# Patient Record
Sex: FEMALE | Race: WHITE | Employment: FULL TIME | URBAN - METROPOLITAN AREA
[De-identification: names, ages, dates, MRNs, and addresses within clinical notes are randomized per-mention and may not be internally consistent; named-entity substitution may affect disease eponyms.]

---

## 2017-04-21 PROCEDURE — 87624 HPV HI-RISK TYP POOLED RSLT: CPT | Performed by: OBSTETRICS & GYNECOLOGY

## 2017-04-21 PROCEDURE — 88175 CYTOPATH C/V AUTO FLUID REDO: CPT | Performed by: OBSTETRICS & GYNECOLOGY

## 2017-04-27 PROBLEM — M54.2 CERVICAL PAIN: Status: ACTIVE | Noted: 2017-04-27

## 2017-04-27 PROBLEM — M76.61 ACHILLES TENDINITIS OF RIGHT LOWER EXTREMITY: Status: ACTIVE | Noted: 2017-04-27

## 2018-03-17 ENCOUNTER — HOSPITAL ENCOUNTER (OUTPATIENT)
Age: 63
Discharge: HOME | End: 2018-03-17
Payer: COMMERCIAL

## 2018-03-17 VITALS
RESPIRATION RATE: 18 BRPM | WEIGHT: 130 LBS | DIASTOLIC BLOOD PRESSURE: 82 MMHG | HEIGHT: 62 IN | OXYGEN SATURATION: 100 % | SYSTOLIC BLOOD PRESSURE: 150 MMHG | TEMPERATURE: 98 F | BODY MASS INDEX: 23.92 KG/M2 | HEART RATE: 108 BPM

## 2018-03-17 DIAGNOSIS — B96.89 ACUTE BACTERIAL PHARYNGITIS: Primary | ICD-10-CM

## 2018-03-17 DIAGNOSIS — J02.8 ACUTE BACTERIAL PHARYNGITIS: Primary | ICD-10-CM

## 2018-03-17 LAB — S PYO AG THROAT QL: NEGATIVE

## 2018-03-17 PROCEDURE — 99213 OFFICE O/P EST LOW 20 MIN: CPT

## 2018-03-17 PROCEDURE — 99214 OFFICE O/P EST MOD 30 MIN: CPT

## 2018-03-17 PROCEDURE — 87430 STREP A AG IA: CPT

## 2018-03-17 RX ORDER — AMOXICILLIN 875 MG/1
875 TABLET, COATED ORAL 2 TIMES DAILY
Qty: 20 TABLET | Refills: 0 | Status: SHIPPED | OUTPATIENT
Start: 2018-03-17 | End: 2018-03-27

## 2018-03-17 NOTE — ED PROVIDER NOTES
Patient presents with:  Sore Throat      HPI:     Sandy Linares is a 58year old female who presents for evaluation of a chief complaint of sore throat, chills, and fatigue for the past 2 days. Positive exposure to strep throat at work.   No difficulty swal edema  HEAD: normocephalic, atraumatic  EYES: sclera non icteric bilateral, conjunctiva clear  EARS: TM  bilateral: normal  NOSE: nasal turbinates: pink, normal mucosa  THROAT: redness noted, uvula midline and airway patent  LUNGS: clear to auscultation bi

## 2018-04-24 PROBLEM — R29.898 TMJ CLICK: Status: ACTIVE | Noted: 2018-04-24

## 2018-04-24 PROBLEM — S03.00XA DISLOCATION OF TEMPOROMANDIBULAR JOINT, INITIAL ENCOUNTER: Status: ACTIVE | Noted: 2018-04-24

## 2018-06-06 PROBLEM — N95.2 VAGINAL ATROPHY: Status: ACTIVE | Noted: 2018-06-06

## 2018-11-14 PROBLEM — Z00.00 ANNUAL PHYSICAL EXAM: Status: ACTIVE | Noted: 2018-11-14

## 2018-11-17 PROCEDURE — 81003 URINALYSIS AUTO W/O SCOPE: CPT | Performed by: INTERNAL MEDICINE

## 2019-09-24 PROCEDURE — 88175 CYTOPATH C/V AUTO FLUID REDO: CPT | Performed by: OBSTETRICS & GYNECOLOGY

## 2019-09-24 PROCEDURE — 87624 HPV HI-RISK TYP POOLED RSLT: CPT | Performed by: OBSTETRICS & GYNECOLOGY

## 2019-12-26 ENCOUNTER — HOSPITAL ENCOUNTER (OUTPATIENT)
Age: 64
Discharge: HOME | End: 2019-12-26
Payer: COMMERCIAL

## 2019-12-26 VITALS
OXYGEN SATURATION: 99 % | TEMPERATURE: 98 F | SYSTOLIC BLOOD PRESSURE: 129 MMHG | HEART RATE: 73 BPM | RESPIRATION RATE: 18 BRPM | DIASTOLIC BLOOD PRESSURE: 65 MMHG

## 2019-12-26 DIAGNOSIS — N30.90 CYSTITIS: Primary | ICD-10-CM

## 2019-12-26 PROCEDURE — 99214 OFFICE O/P EST MOD 30 MIN: CPT

## 2019-12-26 PROCEDURE — 87086 URINE CULTURE/COLONY COUNT: CPT | Performed by: NURSE PRACTITIONER

## 2019-12-26 PROCEDURE — 87088 URINE BACTERIA CULTURE: CPT | Performed by: NURSE PRACTITIONER

## 2019-12-26 PROCEDURE — 87186 SC STD MICRODIL/AGAR DIL: CPT | Performed by: NURSE PRACTITIONER

## 2019-12-26 PROCEDURE — 81002 URINALYSIS NONAUTO W/O SCOPE: CPT

## 2019-12-26 RX ORDER — NITROFURANTOIN 25; 75 MG/1; MG/1
100 CAPSULE ORAL 2 TIMES DAILY
Qty: 10 CAPSULE | Refills: 0 | Status: SHIPPED | OUTPATIENT
Start: 2019-12-26 | End: 2019-12-31

## 2019-12-26 NOTE — ED PROVIDER NOTES
Patient presents with:  Urinary Symptoms      HPI:     Solange Valdivia is a 59year old female with a past history of ITP, hypothyroidism, hyperlipidemia resents with a chief complaint of urinary urgency, frequency, dysuria over the course the last 4 days.   Henna Calhoun understanding. Orders Placed This Encounter      POCT Urinalysis Dipstick Once      Urine Culture, Routine Once      Nitrofurantoin Monohyd Macro 100 MG Oral Cap          Sig: Take 1 capsule (100 mg total) by mouth 2 (two) times daily for 5 days.

## 2019-12-26 NOTE — ED INITIAL ASSESSMENT (HPI)
Reports urinary urgency, frequency and voiding small amounts for the last 4 days. Denies back pain. Denies flank pain.

## 2020-02-25 ENCOUNTER — HOSPITAL ENCOUNTER (OUTPATIENT)
Age: 65
Discharge: HOME | End: 2020-02-25
Payer: COMMERCIAL

## 2020-02-25 VITALS
TEMPERATURE: 98 F | HEIGHT: 62 IN | SYSTOLIC BLOOD PRESSURE: 133 MMHG | RESPIRATION RATE: 17 BRPM | DIASTOLIC BLOOD PRESSURE: 75 MMHG | WEIGHT: 135 LBS | HEART RATE: 96 BPM | OXYGEN SATURATION: 100 % | BODY MASS INDEX: 24.84 KG/M2

## 2020-02-25 DIAGNOSIS — R11.2 NON-INTRACTABLE VOMITING WITH NAUSEA, UNSPECIFIED VOMITING TYPE: Primary | ICD-10-CM

## 2020-02-25 PROCEDURE — 99214 OFFICE O/P EST MOD 30 MIN: CPT

## 2020-02-25 PROCEDURE — 96374 THER/PROPH/DIAG INJ IV PUSH: CPT

## 2020-02-25 PROCEDURE — 96361 HYDRATE IV INFUSION ADD-ON: CPT

## 2020-02-25 RX ORDER — ONDANSETRON 2 MG/ML
4 INJECTION INTRAMUSCULAR; INTRAVENOUS ONCE
Status: COMPLETED | OUTPATIENT
Start: 2020-02-25 | End: 2020-02-25

## 2020-02-25 RX ORDER — 0.9 % SODIUM CHLORIDE 0.9 %
1000 INTRAVENOUS SOLUTION INTRAVENOUS ONCE
Status: COMPLETED | OUTPATIENT
Start: 2020-02-25 | End: 2020-02-25

## 2020-02-25 RX ORDER — ACETAMINOPHEN 500 MG
1000 TABLET ORAL ONCE
Status: COMPLETED | OUTPATIENT
Start: 2020-02-25 | End: 2020-02-25

## 2020-02-25 NOTE — ED INITIAL ASSESSMENT (HPI)
PATIENT REPORTS NAUSEA/VOMITING STARTING TODAY 11. STATES SHE HAD A COLONOSCOPY THIS AM.  REPORTS MULTIPLE EPISODES OF EMESIS. DENIES ABDOMINAL PAIN.   STATES SHE NOTIFIED HER GASTROENTEROLOGIST AND [facility] WAS CALLED IN FOR FOR BUT IT HAS NOT BEEN READY F

## 2020-02-25 NOTE — ED PROVIDER NOTES
Patient presents with:  Vomiting      HPI:     Rocky aSms is a 59year old female with a history of hyperlipidemia, ITP, hypothyroidism presents with a chief complaint of headache and vomiting.   Patient states that today she had a colonoscopy this una Patient eating crackers now. States improvement in headache. No longer nauseated. Tolerating p.o. without any difficulty. I discussed with the patient bland diet. Close follow-up with GI was recommended.   She states that she feels well enough to go h

## 2022-05-19 NOTE — ED INITIAL ASSESSMENT (HPI)
Blood drawn from the patients left antecubital vein, first attempt, without difficulty.   PATIENT ARRIVED AMBULATORY TO ROOM C/O A SORE THROAT X4 DAYS. NO FEVERS. NO COUGH. SLIGHT NASAL CONGESTION. NO N/V/D. EASY NON LABORED RESPIRATIONS.  NO DISTRESS

## 2023-06-01 ENCOUNTER — HOSPITAL ENCOUNTER (OUTPATIENT)
Age: 68
Discharge: HOME | End: 2023-06-01
Attending: EMERGENCY MEDICINE
Payer: MEDICARE

## 2023-06-01 ENCOUNTER — APPOINTMENT (OUTPATIENT)
Dept: CT IMAGING | Age: 68
End: 2023-06-01
Attending: EMERGENCY MEDICINE
Payer: MEDICARE

## 2023-06-01 ENCOUNTER — APPOINTMENT (OUTPATIENT)
Dept: GENERAL RADIOLOGY | Age: 68
End: 2023-06-01
Attending: EMERGENCY MEDICINE
Payer: MEDICARE

## 2023-06-01 VITALS
SYSTOLIC BLOOD PRESSURE: 120 MMHG | DIASTOLIC BLOOD PRESSURE: 51 MMHG | OXYGEN SATURATION: 99 % | TEMPERATURE: 98 F | HEART RATE: 73 BPM | RESPIRATION RATE: 18 BRPM

## 2023-06-01 DIAGNOSIS — R07.89 ACUTE CHEST WALL PAIN: Primary | ICD-10-CM

## 2023-06-01 LAB
#MXD IC: 1 X10ˆ3/UL (ref 0.1–1)
ATRIAL RATE: 64 BPM
BUN BLD-MCNC: 18 MG/DL (ref 7–18)
CHLORIDE BLD-SCNC: 103 MMOL/L (ref 98–112)
CO2 BLD-SCNC: 26 MMOL/L (ref 21–32)
CREAT BLD-MCNC: 1.1 MG/DL
DDIMER WHOLE BLOOD: 604 NG/ML DDU (ref ?–400)
GFR SERPLBLD BASED ON 1.73 SQ M-ARVRAT: 55 ML/MIN/1.73M2 (ref 60–?)
GLUCOSE BLD-MCNC: 106 MG/DL (ref 70–99)
HCT VFR BLD AUTO: 41.2 %
HCT VFR BLD CALC: 46 %
HGB BLD-MCNC: 13.7 G/DL
ISTAT IONIZED CALCIUM FOR CHEM 8: 1.21 MMOL/L (ref 1.12–1.32)
LYMPHOCYTES # BLD AUTO: 1.7 X10ˆ3/UL (ref 1–4)
LYMPHOCYTES NFR BLD AUTO: 21.6 %
MCH RBC QN AUTO: 29 PG (ref 26–34)
MCHC RBC AUTO-ENTMCNC: 33.3 G/DL (ref 31–37)
MCV RBC AUTO: 87.1 FL (ref 80–100)
MIXED CELL %: 13.1 %
NEUTROPHILS # BLD AUTO: 5.2 X10ˆ3/UL (ref 1.5–7.7)
NEUTROPHILS NFR BLD AUTO: 65.3 %
P AXIS: 55 DEGREES
P-R INTERVAL: 160 MS
PLATELET # BLD AUTO: 308 X10ˆ3/UL (ref 150–450)
POTASSIUM BLD-SCNC: 5.3 MMOL/L (ref 3.6–5.1)
Q-T INTERVAL: 400 MS
QRS DURATION: 78 MS
QTC CALCULATION (BEZET): 412 MS
R AXIS: 5 DEGREES
RBC # BLD AUTO: 4.73 X10ˆ6/UL
SODIUM BLD-SCNC: 139 MMOL/L (ref 136–145)
T AXIS: 71 DEGREES
TROPONIN I BLD-MCNC: <0.02 NG/ML
VENTRICULAR RATE: 64 BPM
WBC # BLD AUTO: 7.9 X10ˆ3/UL (ref 4–11)

## 2023-06-01 PROCEDURE — 93010 ELECTROCARDIOGRAM REPORT: CPT

## 2023-06-01 PROCEDURE — 99215 OFFICE O/P EST HI 40 MIN: CPT

## 2023-06-01 PROCEDURE — 71101 X-RAY EXAM UNILAT RIBS/CHEST: CPT | Performed by: EMERGENCY MEDICINE

## 2023-06-01 PROCEDURE — 99214 OFFICE O/P EST MOD 30 MIN: CPT

## 2023-06-01 PROCEDURE — 85378 FIBRIN DEGRADE SEMIQUANT: CPT | Performed by: EMERGENCY MEDICINE

## 2023-06-01 PROCEDURE — 71260 CT THORAX DX C+: CPT | Performed by: EMERGENCY MEDICINE

## 2023-06-01 PROCEDURE — 93005 ELECTROCARDIOGRAM TRACING: CPT

## 2023-06-01 PROCEDURE — 85025 COMPLETE CBC W/AUTO DIFF WBC: CPT | Performed by: EMERGENCY MEDICINE

## 2023-06-01 PROCEDURE — 80047 BASIC METABLC PNL IONIZED CA: CPT

## 2023-06-01 PROCEDURE — 36415 COLL VENOUS BLD VENIPUNCTURE: CPT

## 2023-06-01 PROCEDURE — 84484 ASSAY OF TROPONIN QUANT: CPT

## 2023-06-01 RX ORDER — ACETAMINOPHEN 500 MG
1000 TABLET ORAL ONCE
Status: COMPLETED | OUTPATIENT
Start: 2023-06-01 | End: 2023-06-01

## 2023-06-01 NOTE — DISCHARGE INSTRUCTIONS
Thank you for visiting our immediate care for your health care needs. Please follow up with your regular doctor in the next 1-2 days. If you have any additional problems please return to the immediate care. Please take Tylenol for pain. Please use Salonpas over-the-counter for pain. Use ice. Please take Bakari Carlos as prescribed.

## 2023-06-01 NOTE — ED INITIAL ASSESSMENT (HPI)
Presents with pain to left anterior ribs. States she felt \"pop\" when she stood up from a chair 2 days ago. Painful to take a deep breath. Tender to touch. Difficulty sleeping due to pain.

## 2024-03-14 ENCOUNTER — HOSPITAL ENCOUNTER (OUTPATIENT)
Age: 69
Discharge: HOME | End: 2024-03-14
Payer: MEDICARE

## 2024-03-14 VITALS
RESPIRATION RATE: 20 BRPM | TEMPERATURE: 97 F | HEART RATE: 93 BPM | OXYGEN SATURATION: 100 % | DIASTOLIC BLOOD PRESSURE: 76 MMHG | SYSTOLIC BLOOD PRESSURE: 144 MMHG

## 2024-03-14 DIAGNOSIS — J02.9 VIRAL PHARYNGITIS: Primary | ICD-10-CM

## 2024-03-14 LAB — S PYO AG THROAT QL IA.RAPID: NEGATIVE

## 2024-03-14 PROCEDURE — 99213 OFFICE O/P EST LOW 20 MIN: CPT

## 2024-03-14 PROCEDURE — 99212 OFFICE O/P EST SF 10 MIN: CPT

## 2024-03-14 PROCEDURE — 87651 STREP A DNA AMP PROBE: CPT | Performed by: PHYSICIAN ASSISTANT

## 2024-03-15 NOTE — ED PROVIDER NOTES
Chief Complaint   Patient presents with    Sore Throat     History obtained from: patient   services not used     HPI:     Rosalina Nobles is a 68 year old female who presents with sore throat x 6 days.  Patient endorses pain with swallowing and swollen glands.  Patient states she recently watched her grandchild who had a fever.  Patient has been taking DayQuil and drinking tea and soup.  Denies fever, chills, facial or neck swelling, drooling, voice change, cough, shortness of breath, chest pain, ear pain, sinus pain or nasal congestion, rash.    PMH  Past Medical History:   Diagnosis Date    Depression     Esophageal reflux     GENITO-URINARY DISEASE     uti    Hypothyroidism     INFECTIOUS DISEASE     shingles    ITP (idiopathic thrombocytopenic purpura)     Menopause     Osteopenia     Other and unspecified hyperlipidemia     Rash of entire body 16     er from urgent care for low platelet count and body rash    Varicella        PFSH    PFSH asessment screens reviewed and agree.  Nurses notes reviewed I agree with documentation.    Family History   Problem Relation Age of Onset    Mental Disorder Mother         alzhiemer's    Dementia Mother     Other (Other) Mother     Thyroid Disorder Sister     Stroke Brother     Breast Cancer Cousin 40        pat cousin  age 40 at dx    Breast Cancer Paternal Cousin Female 40        age at dx 40     Family history reviewed with patient/caregiver and is not pertinent to presenting problem.  Social History     Socioeconomic History    Marital status:      Spouse name: Not on file    Number of children: Not on file    Years of education: Not on file    Highest education level: Not on file   Occupational History    Not on file   Tobacco Use    Smoking status: Former     Packs/day: .5     Types: Cigarettes     Quit date: 2009     Years since quittin.6    Smokeless tobacco: Never   Vaping Use    Vaping Use: Never used   Substance and Sexual Activity     Alcohol use: Yes     Alcohol/week: 0.0 standard drinks of alcohol     Comment: occ wine    Drug use: No    Sexual activity: Yes     Partners: Male     Comment: 11/10/2020    Other Topics Concern    Not on file   Social History Narrative    Not on file     Social Determinants of Health     Financial Resource Strain: Not on file   Food Insecurity: Not on file   Transportation Needs: Not on file   Physical Activity: Not on file   Stress: Not on file   Social Connections: Not on file   Housing Stability: Not on file         ROS:   Positive for stated complaint: Sore throat  All other systems reviewed and negative except as noted above.  Constitutional and Vital Signs Reviewed.    Physical Exam:     Findings:    /76   Pulse 93   Temp 97.4 °F (36.3 °C) (Temporal)   Resp 20   SpO2 100%   GENERAL: well developed, no acute distress, non-toxic appearing   SKIN: good skin turgor, no obvious rashes  HEAD: normocephalic, atraumatic  EYES: sclera non-icteric bilaterally, conjunctiva clear bilaterally  OROPHARYNX: MMM, pharynx mildly erythematous, no exudates or swelling, uvula midline, no tongue elevation, maintaining airway and secretions  NECK: supple, no lymphadenopathy, no nuchal rigidity, no trismus, no edema, phonation normal    CARDIO: RRR, normal heart sounds   LUNGS: clear to auscultation bilaterally, no increased WOB, no rales, rhonchi, or wheezes  GI: abdomen soft and non-tender   EXTREMITIES: no cyanosis or edema, LION without difficulty  NEURO: no focal deficits  PSYCH: alert and oriented x3, answering questions appropriately, mood appropriate    MDM/Assessment/Plan:   Orders for this encounter:    Orders Placed This Encounter    Rapid Strep A - ID NOW     Order Specific Question:   Release to patient     Answer:   Immediate       Labs performed this visit:  Recent Results (from the past 10 hour(s))   Rapid Strep A - ID NOW    Collection Time: 03/14/24  6:33 PM    Specimen: Throat; Other   Result Value  Ref Range    Strep A by PCR Negative Negative       Imaging performed this visit:  No orders to display       Medical Decision Making  DDx includes viral pharyngitis versus strep pharyngitis versus laryngitis versus other.  Patient is overall very well-appearing with stable vitals and tolerating oral intake.  No signs of PTA or airway compromise.  Strep negative.  Discussed supportive care including rest, increased fluid intake, OTC Tylenol/Motrin as needed for pain or fevers, and OTC throat lozenges as needed for sore throat.  Instructed patient to go directly to nearest ER with any worsening or concerning symptoms.  Follow-up with PCP.    Amount and/or Complexity of Data Reviewed  Labs: ordered.    Risk  OTC drugs.          Diagnosis:    ICD-10-CM    1. Viral pharyngitis  J02.9           All results reviewed and discussed with patient/patient's family. Patient/patient's family verbalize excellent understanding of instructions and feels comfortable with plan. All of patient's/patient's family's questions were addressed.   See AVS for detailed discharge instructions for your condition today.    Follow Up with:  Alexis Hurd MD  [address] [address]  [phone]            Carla Fair PA-C

## 2024-03-15 NOTE — DISCHARGE INSTRUCTIONS
Alternate Tylenol/Motrin every 3 hours as needed for pain or fever > 100.4   Drink plenty of fluids including warm tea with honey/lemon   Get plenty of rest     You may benefit from using a humidifier  You may benefit from taking throat lozenges (ie Cepacol)   Avoid having air blow on your face    Wash hands often  Disinfect your environment  Do not share utensils or drinks    Follow up with your primary care provider    If you experience severe/worsening pain, difficulty swallowing or breathing, facial or neck swelling, drooling, change in voice, or any other concerning symptoms, go to nearest ER immediately